# Patient Record
Sex: MALE | Race: WHITE
[De-identification: names, ages, dates, MRNs, and addresses within clinical notes are randomized per-mention and may not be internally consistent; named-entity substitution may affect disease eponyms.]

---

## 2021-02-12 ENCOUNTER — NON-APPOINTMENT (OUTPATIENT)
Age: 26
End: 2021-02-12

## 2021-03-08 ENCOUNTER — NON-APPOINTMENT (OUTPATIENT)
Age: 26
End: 2021-03-08

## 2021-03-09 ENCOUNTER — OUTPATIENT (OUTPATIENT)
Dept: OUTPATIENT SERVICES | Age: 26
LOS: 1 days | End: 2021-03-09

## 2021-03-09 ENCOUNTER — APPOINTMENT (OUTPATIENT)
Dept: HEMOPHILIA TREATMENT | Facility: HOSPITAL | Age: 26
End: 2021-03-09

## 2021-03-09 ENCOUNTER — RESULT REVIEW (OUTPATIENT)
Age: 26
End: 2021-03-09

## 2021-03-09 VITALS
HEART RATE: 75 BPM | TEMPERATURE: 98 F | RESPIRATION RATE: 16 BRPM | HEIGHT: 68 IN | DIASTOLIC BLOOD PRESSURE: 78 MMHG | WEIGHT: 202.83 LBS | OXYGEN SATURATION: 98 % | SYSTOLIC BLOOD PRESSURE: 134 MMHG | BODY MASS INDEX: 30.74 KG/M2

## 2021-03-09 DIAGNOSIS — R23.8 OTHER SKIN CHANGES: ICD-10-CM

## 2021-03-09 DIAGNOSIS — D66 HEREDITARY FACTOR VIII DEFICIENCY: ICD-10-CM

## 2021-03-09 DIAGNOSIS — S83.8X2A SPRAIN OF OTHER SPECIFIED PARTS OF LEFT KNEE, INITIAL ENCOUNTER: ICD-10-CM

## 2021-03-09 DIAGNOSIS — T14.8XXA OTHER INJURY OF UNSPECIFIED BODY REGION, INITIAL ENCOUNTER: ICD-10-CM

## 2021-03-09 DIAGNOSIS — Z83.518 FAMILY HISTORY OF OTHER SPECIFIED EYE DISORDER: ICD-10-CM

## 2021-03-09 DIAGNOSIS — Z83.2 FAMILY HISTORY OF DISEASES OF THE BLOOD AND BLOOD-FORMING ORGANS AND CERTAIN DISORDERS INVOLVING THE IMMUNE MECHANISM: ICD-10-CM

## 2021-03-09 LAB
ALBUMIN SERPL ELPH-MCNC: 4.7 G/DL — SIGNIFICANT CHANGE UP (ref 3.3–5)
ALP SERPL-CCNC: 81 U/L — SIGNIFICANT CHANGE UP (ref 40–120)
ALT FLD-CCNC: 19 U/L — SIGNIFICANT CHANGE UP (ref 4–41)
ANION GAP SERPL CALC-SCNC: 12 MMOL/L — SIGNIFICANT CHANGE UP (ref 7–14)
AST SERPL-CCNC: 25 U/L — SIGNIFICANT CHANGE UP (ref 4–40)
BASOPHILS # BLD AUTO: 0.03 K/UL — SIGNIFICANT CHANGE UP (ref 0–0.2)
BASOPHILS NFR BLD AUTO: 0.6 % — SIGNIFICANT CHANGE UP (ref 0–2)
BILIRUB SERPL-MCNC: 0.2 MG/DL — SIGNIFICANT CHANGE UP (ref 0.2–1.2)
BUN SERPL-MCNC: 18 MG/DL — SIGNIFICANT CHANGE UP (ref 7–23)
CALCIUM SERPL-MCNC: 9.5 MG/DL — SIGNIFICANT CHANGE UP (ref 8.4–10.5)
CHLORIDE SERPL-SCNC: 103 MMOL/L — SIGNIFICANT CHANGE UP (ref 98–107)
CO2 SERPL-SCNC: 24 MMOL/L — SIGNIFICANT CHANGE UP (ref 22–31)
CREAT SERPL-MCNC: 0.95 MG/DL — SIGNIFICANT CHANGE UP (ref 0.5–1.3)
EOSINOPHIL # BLD AUTO: 0.13 K/UL — SIGNIFICANT CHANGE UP (ref 0–0.5)
EOSINOPHIL NFR BLD AUTO: 2.5 % — SIGNIFICANT CHANGE UP (ref 0–6)
GLUCOSE SERPL-MCNC: 98 MG/DL — SIGNIFICANT CHANGE UP (ref 70–99)
HCT VFR BLD CALC: 41.8 % — SIGNIFICANT CHANGE UP (ref 39–50)
HGB BLD-MCNC: 14.1 G/DL — SIGNIFICANT CHANGE UP (ref 13–17)
IANC: 3.52 K/UL — SIGNIFICANT CHANGE UP (ref 1.5–8.5)
IMM GRANULOCYTES NFR BLD AUTO: 0.8 % — SIGNIFICANT CHANGE UP (ref 0–1.5)
LYMPHOCYTES # BLD AUTO: 1.15 K/UL — SIGNIFICANT CHANGE UP (ref 1–3.3)
LYMPHOCYTES # BLD AUTO: 21.7 % — SIGNIFICANT CHANGE UP (ref 13–44)
MCHC RBC-ENTMCNC: 29.7 PG — SIGNIFICANT CHANGE UP (ref 27–34)
MCHC RBC-ENTMCNC: 33.7 GM/DL — SIGNIFICANT CHANGE UP (ref 32–36)
MCV RBC AUTO: 88.2 FL — SIGNIFICANT CHANGE UP (ref 80–100)
MONOCYTES # BLD AUTO: 0.42 K/UL — SIGNIFICANT CHANGE UP (ref 0–0.9)
MONOCYTES NFR BLD AUTO: 7.9 % — SIGNIFICANT CHANGE UP (ref 2–14)
NEUTROPHILS # BLD AUTO: 3.52 K/UL — SIGNIFICANT CHANGE UP (ref 1.8–7.4)
NEUTROPHILS NFR BLD AUTO: 66.5 % — SIGNIFICANT CHANGE UP (ref 43–77)
NRBC # BLD: 0 /100 WBCS — SIGNIFICANT CHANGE UP
NRBC # FLD: 0 K/UL — SIGNIFICANT CHANGE UP
PLATELET # BLD AUTO: 212 K/UL — SIGNIFICANT CHANGE UP (ref 150–400)
POTASSIUM SERPL-MCNC: 4.3 MMOL/L — SIGNIFICANT CHANGE UP (ref 3.5–5.3)
POTASSIUM SERPL-SCNC: 4.3 MMOL/L — SIGNIFICANT CHANGE UP (ref 3.5–5.3)
PROT SERPL-MCNC: 6.7 G/DL — SIGNIFICANT CHANGE UP (ref 6–8.3)
RBC # BLD: 4.74 M/UL — SIGNIFICANT CHANGE UP (ref 4.2–5.8)
RBC # FLD: 12 % — SIGNIFICANT CHANGE UP (ref 10.3–14.5)
SODIUM SERPL-SCNC: 139 MMOL/L — SIGNIFICANT CHANGE UP (ref 135–145)
WBC # BLD: 5.29 K/UL — SIGNIFICANT CHANGE UP (ref 3.8–10.5)
WBC # FLD AUTO: 5.29 K/UL — SIGNIFICANT CHANGE UP (ref 3.8–10.5)

## 2021-03-10 LAB
24R-OH-CALCIDIOL SERPL-MCNC: 30.5 NG/ML — SIGNIFICANT CHANGE UP (ref 30–80)
FACT VIII ACT/NOR PPP: 8.5 % — LOW (ref 45–125)
FACT VIII ACT/NOR PPP: SIGNIFICANT CHANGE UP
FACTOR TESTED: SIGNIFICANT CHANGE UP

## 2021-03-15 PROBLEM — S83.8X2A INJURY OF MENISCUS OF LEFT KNEE: Status: RESOLVED | Noted: 2021-03-15 | Resolved: 2021-03-15

## 2021-03-15 PROBLEM — T14.8XXA HEMATOMA OF SKIN: Status: RESOLVED | Noted: 2021-03-15 | Resolved: 2021-03-15

## 2021-03-15 NOTE — PHYSICAL EXAM
[Normal] : no cervical lymphadenopathy [Normal] : awake and interactive, normal strength tone throughout. [de-identified] : see PT note for details

## 2021-03-15 NOTE — ASSESSMENT
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 Discussed symptoms of joint and muscle bleeds and head trauma which could cause intracranial bleeding. Any pain, swelling, head trauma needs to be reported. If he has any major head trauma( fall from a height, running and hitting head against a door/ wall etc involving acceleration add deceleration ) he needs to come to the ED with his emergency dose of factor which he will receive first and then a head CT will be performed to r/o an ICH. Symptoms of headache, vomiting need to be reported to [facility]. He needs to contact the [facility] prior to procedures for a treatment plan and prior to travel for a travel letter. For dental procedures in addition to factor replacement he will need tranexamic acid and I explained the rationale behind its usage to counter excessive fibrinolysis in the mouth despite taking factor which raise FVIII levels but do not counter fibrinolysis. He was given the [facility] booklet for education regarding hemophilia and info on the NHF website: www.hemophilia.org. I discussed genetic transmission of hemophilia briefly- X linked and risk of him transmitting the gene to all his daughters but none of his sons . He will be called by Ms Haskins [facility] genetic counselor to discuss in more depth and also see if we can get genetic testing for hemophilia A on him. Discussed that if he has a joint bleed he will need to start prophylaxis with factor infusions 1-2 times/ week to prevent hemophilic arthropathy or use a new product Hemlibra  a bispecific antibody which mimics FVIII , has a long half life and is infused SC weekly. I discussed sending a dose of Kogenate- FVIII concentrate for emergency in case of a bleed or major trauma; he needs to bring it into the Ed or [facility] if coming in for bleed evaluation to further delay administrant factor; I discussed 1-2 doses of NSAIDS- ibuprofen is ok but if more required he needs to take Tylenol for pain/ fever; He was gain given contact info for [facility] - regular and emergency numbers and encouraged to call [facility] for any bleeds.   I discussed need for a  medic alert bracelet or having this info on his phone which can be accessed by EMS without a password. I discussed annual Comps for follow up and visits as needed for bleed assessment. He verbalized this information . \par \par RTC- 1 yr

## 2021-03-15 NOTE — HISTORY OF PRESENT ILLNESS
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

## 2022-03-08 ENCOUNTER — LABORATORY RESULT (OUTPATIENT)
Age: 27
End: 2022-03-08

## 2022-03-08 ENCOUNTER — OUTPATIENT (OUTPATIENT)
Dept: OUTPATIENT SERVICES | Age: 27
LOS: 1 days | End: 2022-03-08

## 2022-03-08 ENCOUNTER — APPOINTMENT (OUTPATIENT)
Dept: HEMOPHILIA TREATMENT | Facility: HOSPITAL | Age: 27
End: 2022-03-08

## 2022-03-08 VITALS
HEIGHT: 67 IN | BODY MASS INDEX: 29.34 KG/M2 | RESPIRATION RATE: 18 BRPM | HEART RATE: 79 BPM | TEMPERATURE: 98.1 F | DIASTOLIC BLOOD PRESSURE: 79 MMHG | SYSTOLIC BLOOD PRESSURE: 127 MMHG | WEIGHT: 186.95 LBS

## 2022-03-08 DIAGNOSIS — D66 HEREDITARY FACTOR VIII DEFICIENCY: ICD-10-CM

## 2022-03-09 LAB
25(OH)D3 SERPL-MCNC: 34.7 NG/ML
ALBUMIN SERPL ELPH-MCNC: 5.1 G/DL
ALP BLD-CCNC: 72 U/L
ALT SERPL-CCNC: 15 U/L
ANION GAP SERPL CALC-SCNC: 13 MMOL/L
APPEARANCE: CLEAR
AST SERPL-CCNC: 22 U/L
BASOPHILS # BLD AUTO: 0.03 K/UL
BASOPHILS NFR BLD AUTO: 0.6 %
BILIRUB SERPL-MCNC: 0.9 MG/DL
BILIRUBIN URINE: NEGATIVE
BLOOD URINE: NEGATIVE
BUN SERPL-MCNC: 15 MG/DL
CALCIUM SERPL-MCNC: 9.9 MG/DL
CHLORIDE SERPL-SCNC: 102 MMOL/L
CO2 SERPL-SCNC: 26 MMOL/L
COLOR: YELLOW
CREAT SERPL-MCNC: 1.2 MG/DL
EGFR: 84 ML/MIN/1.73M2
EOSINOPHIL # BLD AUTO: 0.12 K/UL
EOSINOPHIL NFR BLD AUTO: 2.4 %
FACT INHIB XXX PPP-ACNC: 0 BU
FACT VIII ACT/NOR PPP: 9.6 %
FACTOR TESTED: NORMAL
GLUCOSE QUALITATIVE U: NEGATIVE
GLUCOSE SERPL-MCNC: 98 MG/DL
HCT VFR BLD CALC: 44.3 %
HGB BLD-MCNC: 15.5 G/DL
IMM GRANULOCYTES NFR BLD AUTO: 0.4 %
KETONES URINE: ABNORMAL
LEUKOCYTE ESTERASE URINE: NEGATIVE
LYMPHOCYTES # BLD AUTO: 0.97 K/UL
LYMPHOCYTES NFR BLD AUTO: 19.1 %
MAN DIFF?: NORMAL
MCHC RBC-ENTMCNC: 30.2 PG
MCHC RBC-ENTMCNC: 35 GM/DL
MCV RBC AUTO: 86.4 FL
MONOCYTES # BLD AUTO: 0.53 K/UL
MONOCYTES NFR BLD AUTO: 10.5 %
NEUTROPHILS # BLD AUTO: 3.4 K/UL
NEUTROPHILS NFR BLD AUTO: 67 %
NITRITE URINE: NEGATIVE
PH URINE: 6
PLATELET # BLD AUTO: 212 K/UL
POTASSIUM SERPL-SCNC: 4.2 MMOL/L
PROT SERPL-MCNC: 6.9 G/DL
PROTEIN URINE: ABNORMAL
RBC # BLD: 5.13 M/UL
RBC # FLD: 11.8 %
SODIUM SERPL-SCNC: 141 MMOL/L
SPECIFIC GRAVITY URINE: 1.03
UROBILINOGEN URINE: NORMAL
WBC # FLD AUTO: 5.07 K/UL

## 2022-03-09 NOTE — PHYSICAL EXAM
[Normal] : no cervical lymphadenopathy [Normal] : awake and interactive, normal strength tone throughout. [de-identified] : see PT note for details

## 2022-03-09 NOTE — HISTORY OF PRESENT ILLNESS
[de-identified] : MAKI ADRIAN is a 26 year old male with Mild FVIII (9%) who presents today for comprehensive exam. No reported bleeds since last visit. 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 Regularly sees PCP and gets Dental cleanings twice a year. no upcoming procedures. Received COVID vaccine and booster.\par Denies gum bleeds, nosebleed, hematuria, melena/hematochezia.

## 2022-03-09 NOTE — ASSESSMENT
[FreeTextEntry1] : MAKI ADRIAN is a 26 year old male with Mild FVIII (9%) who presents today for comprehensive exam. No reported bleeds since last visit. 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 Regularly sees PCP and gets Dental cleanings twice a year. no upcoming procedures. Received COVID vaccine and booster.\par Denies gum bleeds, nosebleed, hematuria, melena/hematochezia. \par \par Hemophilia\par -Reviewed Hemophilia: mild, moderate , severe and implications, bleeding symptoms, treatments and inheritiance. Based on his factor level and previous bleeding history his bleeding will be trauma related however bleeding is unpredictable. Maki understand that because he was diagnosed after prolonged bleeding from wisdom teeth extractions.\par -Discussed symptoms of joint and muscle bleeds and head trauma which could cause intracranial bleeding. Any pain, swelling, head trauma needs to be reported. If he has any major head trauma (fall from a height, running and hitting head against a door/ wall etc involving acceleration add deceleration) he needs to come to the ED with his emergency dose of factor which he will receive first and then a head CT will be performed to r/o an ICH. \par -Discussed to bring his factor with him when he travels away from home for vacation or long weekends. Not every hospital has factor. \par -He should contact the [facility] prior to procedures for a treatment plan and prior to travel for a travel letter. \par -For dental procedures in addition to factor replacement he will need tranexamic acid. patient verbalized understanding. \par -Discussed that if he has a joint bleed the recommendation is to start prophylaxis to prevent hemophilic arthropathy \par -Tylenol for pain and fever. If he needs NSAIDs 1 dose will be ok but to call the [facility] for recommendations. \par I discussed 1-2 doses of NSAIDS- ibuprofen is ok but if more required he needs to take Tylenol for pain/ fever\par -flat feet: sent a link for shoe inserts to help with flat feet. he works long hours on his feet wearing heavy boots\par -discussed if not a bleed he can use heat and ice to help with any strains. if in doubt call the [facility] prior. \par \par General\par -Current with dental and PCP\par -met with interdiscplinary team\par \par -RTC every other year,  or as needed.

## 2022-04-07 ENCOUNTER — APPOINTMENT (OUTPATIENT)
Dept: HEMOPHILIA TREATMENT | Facility: HOSPITAL | Age: 27
End: 2022-04-07

## 2023-03-20 ENCOUNTER — APPOINTMENT (OUTPATIENT)
Dept: HEMOPHILIA TREATMENT | Facility: HOSPITAL | Age: 28
End: 2023-03-20

## 2023-03-20 ENCOUNTER — OUTPATIENT (OUTPATIENT)
Dept: OUTPATIENT SERVICES | Age: 28
LOS: 1 days | End: 2023-03-20

## 2023-03-20 VITALS — HEIGHT: 67.32 IN | BODY MASS INDEX: 28.73 KG/M2 | WEIGHT: 185.19 LBS

## 2023-03-20 DIAGNOSIS — D66 HEREDITARY FACTOR VIII DEFICIENCY: ICD-10-CM

## 2023-03-20 LAB
ALBUMIN SERPL ELPH-MCNC: 4.8 G/DL
ALP BLD-CCNC: 62 U/L
ALT SERPL-CCNC: 40 U/L
ANION GAP SERPL CALC-SCNC: 17 MMOL/L
APPEARANCE: CLEAR
AST SERPL-CCNC: 27 U/L
BASOPHILS # BLD AUTO: 0.03 K/UL
BASOPHILS NFR BLD AUTO: 0.6 %
BILIRUB SERPL-MCNC: 0.7 MG/DL
BILIRUBIN URINE: NEGATIVE
BLOOD URINE: NEGATIVE
BUN SERPL-MCNC: 15 MG/DL
CALCIUM SERPL-MCNC: 9.6 MG/DL
CHLORIDE SERPL-SCNC: 102 MMOL/L
CHOLEST SERPL-MCNC: 204 MG/DL
CO2 SERPL-SCNC: 21 MMOL/L
COLOR: YELLOW
CREAT SERPL-MCNC: 0.97 MG/DL
EGFR: 108 ML/MIN/1.73M2
EOSINOPHIL # BLD AUTO: 0.1 K/UL
EOSINOPHIL NFR BLD AUTO: 2 %
ESTIMATED AVERAGE GLUCOSE: 100
FACT INHIB XXX PPP-ACNC: 0 BU
FACT VIII ACT/NOR PPP: 8.6 %
FACTOR TESTED: NORMAL
GLUCOSE QUALITATIVE U: NEGATIVE
GLUCOSE SERPL-MCNC: 100 MG/DL
HBA1C MFR BLD HPLC: 5.1 %
HCT VFR BLD CALC: 45 %
HDLC SERPL-MCNC: 52 MG/DL
HGB BLD-MCNC: 15.4 G/DL
IMM GRANULOCYTES NFR BLD AUTO: 1.2 %
KETONES URINE: NEGATIVE
LDLC SERPL CALC-MCNC: 122 MG/DL
LEUKOCYTE ESTERASE URINE: NEGATIVE
LYMPHOCYTES # BLD AUTO: 1.03 K/UL
LYMPHOCYTES NFR BLD AUTO: 20.6 %
MAN DIFF?: NORMAL
MCHC RBC-ENTMCNC: 29.7 PG
MCHC RBC-ENTMCNC: 34.2 GM/DL
MCV RBC AUTO: 86.9 FL
MONOCYTES # BLD AUTO: 0.41 K/UL
MONOCYTES NFR BLD AUTO: 8.2 %
NEUTROPHILS # BLD AUTO: 3.37 K/UL
NEUTROPHILS NFR BLD AUTO: 67.4 %
NITRITE URINE: NEGATIVE
NONHDLC SERPL-MCNC: 152 MG/DL
PH URINE: 6.5
PLATELET # BLD AUTO: 225 K/UL
POTASSIUM SERPL-SCNC: 4 MMOL/L
PROT SERPL-MCNC: 7 G/DL
PROTEIN URINE: ABNORMAL
RBC # BLD: 5.18 M/UL
RBC # FLD: 11.7 %
SODIUM SERPL-SCNC: 140 MMOL/L
SPECIFIC GRAVITY URINE: 1.02
TRIGL SERPL-MCNC: 148 MG/DL
UROBILINOGEN URINE: NORMAL
WBC # FLD AUTO: 5 K/UL

## 2023-03-20 RX ORDER — ANTIHEMOPHILIC FACTOR (RECOMBINANT) 3000 (+/-)
3000 KIT INTRAVENOUS AS DIRECTED
Qty: 1 | Refills: 2 | Status: COMPLETED | COMMUNITY
Start: 2021-03-10 | End: 2023-03-20

## 2023-03-21 ENCOUNTER — NON-APPOINTMENT (OUTPATIENT)
Age: 28
End: 2023-03-21

## 2023-03-21 ENCOUNTER — APPOINTMENT (OUTPATIENT)
Dept: HEMOPHILIA TREATMENT | Facility: HOSPITAL | Age: 28
End: 2023-03-21

## 2023-03-21 LAB — 25(OH)D3 SERPL-MCNC: 52.2 NG/ML

## 2023-03-26 NOTE — HEALTH RISK ASSESSMENT
[HIV Test offered] : HIV Test offered [Hepatitis C test offered] : Hepatitis C test offered [Housing] : housing [With Family] : lives with family [High School] : high school [Single] : single [Smoke Detector] : smoke detector [Carbon Monoxide Detector] : carbon monoxide detector [Change in mental status noted] : No change in mental status noted [Language] : denies difficulty with language [Behavior] : denies difficulty with behavior [Sexually Active] : not sexually active [Reports changes in hearing] : Reports no changes in hearing [Reports changes in vision] : Reports no changes in vision [Reports normal functional visual acuity (ie: able to read med bottle)] : Reports poor functional visual acuity.  [Reports changes in dental health] : Reports no changes in dental health [de-identified] : father [FreeTextEntry2] : M[employer]

## 2023-03-26 NOTE — END OF VISIT
[Time Spent: ___ minutes] : I have spent [unfilled] minutes of time on the encounter. [>50% of the face to face encounter time was spent on counseling and/or coordination of care for ___] : Greater than 50% of the face to face encounter time was spent on counseling and/or coordination of care for [unfilled] [FreeTextEntry3] : History, exam and plan discussed with JEFFERY Burkett

## 2023-03-26 NOTE — ASSESSMENT
[FreeTextEntry1] : MAKI ADRIAN is a 27 year old male with Mild FVIII (9%) who presents today for annual exam. He started day shift at the [employer] and will not be able to request time off for 6 months. No reported bleeding since last visit. He agreed to switch to Kovaltry but declined first infusion today. He understands he will bring to ER or [facility] for first infusion when needed. Has dental appointment next week, doesn't have a PCP. Reports his mother passed away in June after a fall and declining health. \par Denies gum bleeds, nosebleed, hematuria, melena/hematochezia. \par \par Hemophilia\par -Education provided on Hemophilia, its implications and genetics. has good understanding. \par -Discussed mechanism of action of Hemlibra, difference between Hemlibra and Factor, and Hemlibra prevents a bleed and factor is needed to treat a bleed. Verbalized understanding. Since he hasn't had any major bleeding issues we will continue on demand. \par -Any head trauma mild or severe should prompt and emergent visit to the ER for a Factor infusion and CT head\par -Reviewed symptoms and signs of joint bleeds, muscle bleeds, and ICH and appropriate interventions, such as RICE, Factor infusion. Reviewed the brand of Factor and dosing.\par -Call the [facility] immediately with any bleeding sign/symptom\par -Recommend calling [facility] prior to procedures/surgeries for treatment recommendations, including dental extraction.\par -Counseled against NSAID and ASA use, they cause qualitative platelet dysfunction and can increase bleeding risks.\par -Counseled against inebriation to prevent trauma and ICH which can be fatal\par -recommend calling [facility] at least 2 weeks ahead of time for medication order renewals to prevent running out product. Always heck expiration dates and to bring factor with him when he is away from home. \par -Assisted patient in setting up phone Medic Alert jadiel.\par -flat feet: he bought shoe inserts to help with flat feet. he works long hours on his feet wearing heavy boots\par -discussed if not a bleed he can use heat and ice to help with any strains. if in doubt call the [facility] prior. \par \par General\par -Current with dental. hasn't been to PCP in over a year. PCP screenings will be performed today. declined STI screenings, reports not sexually active since last year. \par \par -Annual comp visits.\par \par \par \par \par \par \par \par -RTC every other year,  or as needed.

## 2024-03-11 ENCOUNTER — NON-APPOINTMENT (OUTPATIENT)
Age: 29
End: 2024-03-11

## 2024-03-11 ENCOUNTER — APPOINTMENT (OUTPATIENT)
Dept: HEMOPHILIA TREATMENT | Facility: HOSPITAL | Age: 29
End: 2024-03-11

## 2024-03-11 ENCOUNTER — OUTPATIENT (OUTPATIENT)
Dept: OUTPATIENT SERVICES | Age: 29
LOS: 1 days | End: 2024-03-11

## 2024-03-11 VITALS — BODY MASS INDEX: 32.11 KG/M2 | WEIGHT: 209.44 LBS | HEIGHT: 67.68 IN

## 2024-03-11 VITALS
DIASTOLIC BLOOD PRESSURE: 78 MMHG | RESPIRATION RATE: 16 BRPM | TEMPERATURE: 98.4 F | HEIGHT: 67.68 IN | SYSTOLIC BLOOD PRESSURE: 128 MMHG | HEART RATE: 91 BPM

## 2024-03-11 DIAGNOSIS — D66 HEREDITARY FACTOR VIII DEFICIENCY: ICD-10-CM

## 2024-03-11 LAB
ALBUMIN SERPL ELPH-MCNC: 4.6 G/DL
ALP BLD-CCNC: 63 U/L
ALT SERPL-CCNC: 22 U/L
ANION GAP SERPL CALC-SCNC: 14 MMOL/L
APPEARANCE: CLEAR
AST SERPL-CCNC: 30 U/L
BACTERIA: NEGATIVE /HPF
BASOPHILS # BLD AUTO: 0.03 K/UL
BASOPHILS NFR BLD AUTO: 0.6 %
BILIRUB SERPL-MCNC: 0.5 MG/DL
BILIRUBIN URINE: NEGATIVE
BLOOD URINE: NEGATIVE
BUN SERPL-MCNC: 20 MG/DL
CALCIUM SERPL-MCNC: 9.2 MG/DL
CAST: 0 /LPF
CHLORIDE SERPL-SCNC: 104 MMOL/L
CHOLEST SERPL-MCNC: 163 MG/DL
CO2 SERPL-SCNC: 21 MMOL/L
COLOR: YELLOW
CREAT SERPL-MCNC: 0.92 MG/DL
EGFR: 115 ML/MIN/1.73M2
EOSINOPHIL # BLD AUTO: 0.11 K/UL
EOSINOPHIL NFR BLD AUTO: 2.3 %
EPITHELIAL CELLS: 0 /HPF
ESTIMATED AVERAGE GLUCOSE: 97
GLUCOSE QUALITATIVE U: NEGATIVE MG/DL
GLUCOSE SERPL-MCNC: 104 MG/DL
HBA1C MFR BLD HPLC: 5 %
HCT VFR BLD CALC: 41.4 %
HDLC SERPL-MCNC: 43 MG/DL
HGB BLD-MCNC: 14.8 G/DL
IMM GRANULOCYTES NFR BLD AUTO: 1.1 %
KETONES URINE: NEGATIVE MG/DL
LDLC SERPL CALC-MCNC: 99 MG/DL
LEUKOCYTE ESTERASE URINE: NEGATIVE
LYMPHOCYTES # BLD AUTO: 1.11 K/UL
LYMPHOCYTES NFR BLD AUTO: 23.3 %
MAN DIFF?: NORMAL
MCHC RBC-ENTMCNC: 30.8 PG
MCHC RBC-ENTMCNC: 35.7 GM/DL
MCV RBC AUTO: 86.3 FL
MICROSCOPIC-UA: NORMAL
MONOCYTES # BLD AUTO: 0.36 K/UL
MONOCYTES NFR BLD AUTO: 7.6 %
NEUTROPHILS # BLD AUTO: 3.1 K/UL
NEUTROPHILS NFR BLD AUTO: 65.1 %
NITRITE URINE: NEGATIVE
NONHDLC SERPL-MCNC: 120 MG/DL
PH URINE: 6
PLATELET # BLD AUTO: 219 K/UL
PMV BLD AUTO: 0 /100 WBCS
POTASSIUM SERPL-SCNC: 4.1 MMOL/L
PROT SERPL-MCNC: 6.8 G/DL
PROTEIN URINE: NEGATIVE MG/DL
RBC # BLD: 4.8 M/UL
RBC # FLD: 11.9 %
RED BLOOD CELLS URINE: 0 /HPF
SODIUM SERPL-SCNC: 139 MMOL/L
SPECIFIC GRAVITY URINE: 1.02
TRIGL SERPL-MCNC: 106 MG/DL
TSH SERPL-ACNC: 0.46 UIU/ML
UROBILINOGEN URINE: 0.2 MG/DL
WBC # FLD AUTO: 4.76 K/UL
WHITE BLOOD CELLS URINE: 0 /HPF

## 2024-03-18 LAB
25(OH)D3 SERPL-MCNC: 32.4 NG/ML
FACT INHIB XXX PPP-ACNC: 0 BU
FACT VIII ACT/NOR PPP: 6.6 %
FACTOR TESTED: NORMAL
HBV CORE IGG+IGM SER QL: NONREACTIVE
HBV SURFACE AB SER QL: REACTIVE
HBV SURFACE AG SER QL: NONREACTIVE
HCV AB SER QL: NONREACTIVE
HCV S/CO RATIO: 0.27 S/CO
HEPATITIS A IGG ANTIBODY: NONREACTIVE

## 2024-04-09 NOTE — HISTORY OF PRESENT ILLNESS
[de-identified] : MAKI ADRIAN is a 28 year old male with Mild Factor VIII Deficiency 9% who presents today for annual exam.  No reported bleeding since last visit. He has Kovaltry at home but declined to have the first infusion until it is needed. He understands he will bring to ER or [facility] for first infusion when needed. He works at the [employer], recently switched to day shift which has helped his sleeping. He is current with dental, doesn't have a PCP. No upcoming procedures. Current with covid and flu vaccine. Denies gum bleeds, nosebleed, hematuria, melena/hematochezia.

## 2024-04-09 NOTE — ASSESSMENT
[FreeTextEntry1] : Hemophilia  -Education provided on Hemophilia, its implications and genetics. has good understanding. -Discussed mechanism of action of Hemlibra, difference between Hemlibra and Factor, and Hemlibra prevents a bleed and factor is needed to treat a bleed. Verbalized understanding. Since he hasn't had any major bleeding issues we will continue on demand. -Any head trauma mild or severe should prompt and emergent visit to the ER for a Factor infusion and CT head -Reviewed symptoms and signs of joint bleeds, muscle bleeds, and ICH and appropriate interventions, such as RICE, Factor infusion. Reviewed the brand of Factor and dosing. -Call the [facility] immediately with any bleeding sign/symptom -Recommend calling [facility] prior to procedures/surgeries for treatment recommendations, including dental extraction. -Counseled against NSAID and ASA use, they cause qualitative platelet dysfunction and can increase bleeding risks. -Counseled against inebriation to prevent trauma and ICH which can be fatal -recommend calling [facility] at least 2 weeks ahead of time for medication order renewals to prevent running out product.  -Discussed to bring factor when traveling away from home for vacation or long weekends. Not every hospital has factor. -Discussed to always check expiration dates on factor and have unexpired factor at home. -Assisted patient in setting up phone Medic Alert jadiel. -Any new physical activity whether on a team, with friends, or at the gym should be discussed with the [facility]. Provided the [employer] It [employer] guide and discussed options.  general -Current with dental. hasn't been to PCP in over a year. PCP screenings will be performed today. declined STI screenings, reports not sexually active since last year.  -Annual comp visits.

## 2024-04-09 NOTE — END OF VISIT
[Time Spent: ___ minutes] : I have spent [unfilled] minutes of time on the encounter. [FreeTextEntry3] : History, exam and plan discussed with JEFFERY Burkett

## 2024-05-02 RX ORDER — ANTIHEMOPHIL.FVIII,FULL LENGTH 3000 (+/-)
3000 VIAL (EA) INTRAVENOUS
Qty: 2 | Refills: 0 | Status: ACTIVE | COMMUNITY
Start: 2023-03-20 | End: 1900-01-01

## 2025-03-03 ENCOUNTER — OUTPATIENT (OUTPATIENT)
Dept: OUTPATIENT SERVICES | Age: 30
LOS: 1 days | End: 2025-03-03

## 2025-03-03 ENCOUNTER — APPOINTMENT (OUTPATIENT)
Dept: HEMOPHILIA TREATMENT | Facility: HOSPITAL | Age: 30
End: 2025-03-03

## 2025-03-03 VITALS
TEMPERATURE: 98.24 F | SYSTOLIC BLOOD PRESSURE: 125 MMHG | RESPIRATION RATE: 18 BRPM | DIASTOLIC BLOOD PRESSURE: 81 MMHG | HEART RATE: 73 BPM

## 2025-03-03 DIAGNOSIS — E04.1 NONTOXIC SINGLE THYROID NODULE: ICD-10-CM

## 2025-03-03 DIAGNOSIS — D66 HEREDITARY FACTOR VIII DEFICIENCY: ICD-10-CM

## 2025-03-03 LAB
ALBUMIN SERPL ELPH-MCNC: 4.7 G/DL
ALP BLD-CCNC: 61 U/L
ALT SERPL-CCNC: 22 U/L
ANION GAP SERPL CALC-SCNC: 11 MMOL/L
APPEARANCE: CLEAR
AST SERPL-CCNC: 19 U/L
AUTO NRBC #: 0 K/UL
BASOPHILS # BLD AUTO: 0.04 K/UL
BASOPHILS NFR BLD AUTO: 0.8 %
BILIRUB SERPL-MCNC: 0.5 MG/DL
BILIRUBIN URINE: NEGATIVE
BLOOD URINE: NEGATIVE
BUN SERPL-MCNC: 23 MG/DL
CALCIUM SERPL-MCNC: 9.3 MG/DL
CHLORIDE SERPL-SCNC: 104 MMOL/L
CO2 SERPL-SCNC: 25 MMOL/L
COLOR: YELLOW
CREAT SERPL-MCNC: 0.94 MG/DL
EGFRCR SERPLBLD CKD-EPI 2021: 111 ML/MIN/1.73M2
EOSINOPHIL # BLD AUTO: 0.08 K/UL
EOSINOPHIL NFR BLD AUTO: 1.7 %
ESTIMATED AVERAGE GLUCOSE: 103
GLUCOSE QUALITATIVE U: NEGATIVE MG/DL
GLUCOSE SERPL-MCNC: 100 MG/DL
HBA1C MFR BLD HPLC: 5.2 %
HCT VFR BLD CALC: 46.6 %
HGB BLD-MCNC: 15.7 G/DL
IMM GRANULOCYTES NFR BLD AUTO: 0.6 %
KETONES URINE: ABNORMAL MG/DL
LEUKOCYTE ESTERASE URINE: NEGATIVE
LYMPHOCYTES # BLD AUTO: 1.27 K/UL
LYMPHOCYTES NFR BLD AUTO: 26.3 %
MAN DIFF?: NORMAL
MCHC RBC-ENTMCNC: 29.5 PG
MCHC RBC-ENTMCNC: 33.7 G/DL
MCV RBC AUTO: 87.4 FL
MONOCYTES # BLD AUTO: 0.32 K/UL
MONOCYTES NFR BLD AUTO: 6.6 %
NEUTROPHILS # BLD AUTO: 3.09 K/UL
NEUTROPHILS NFR BLD AUTO: 64 %
NITRITE URINE: NEGATIVE
PH URINE: 6
PLATELET # BLD AUTO: 240 K/UL
PMV BLD AUTO: 0 /100 WBCS
POTASSIUM SERPL-SCNC: 4.2 MMOL/L
PROT SERPL-MCNC: 7.3 G/DL
PROTEIN URINE: NEGATIVE MG/DL
RBC # BLD: 5.33 M/UL
RBC # FLD: 11.9 %
SODIUM SERPL-SCNC: 140 MMOL/L
SPECIFIC GRAVITY URINE: 1.03
TSH SERPL-ACNC: 0.88 UIU/ML
UROBILINOGEN URINE: 0.2 MG/DL
WBC # FLD AUTO: 4.83 K/UL

## 2025-03-04 LAB — 25(OH)D3 SERPL-MCNC: 40.3 NG/ML

## 2025-03-07 PROBLEM — E04.1 THYROID NODULE: Status: ACTIVE | Noted: 2025-03-03
